# Patient Record
Sex: MALE | Race: ASIAN | URBAN - METROPOLITAN AREA
[De-identification: names, ages, dates, MRNs, and addresses within clinical notes are randomized per-mention and may not be internally consistent; named-entity substitution may affect disease eponyms.]

---

## 2018-03-15 ENCOUNTER — HOSPITAL ENCOUNTER (OUTPATIENT)
Facility: CLINIC | Age: 33
Setting detail: SPECIMEN
Discharge: HOME | End: 2018-03-15
Admitting: INTERNAL MEDICINE

## 2018-03-15 PROCEDURE — 80076 HEPATIC FUNCTION PANEL: CPT | Performed by: INTERNAL MEDICINE

## 2018-03-16 DIAGNOSIS — K59.00 CONSTIPATION, UNSPECIFIED: Primary | ICD-10-CM

## 2018-03-16 LAB
ALBUMIN SERPL-MCNC: 4.5 G/DL (ref 3.4–5)
ALP SERPL-CCNC: 87 U/L (ref 40–150)
ALT SERPL W P-5'-P-CCNC: 35 U/L (ref 0–70)
AST SERPL W P-5'-P-CCNC: 30 U/L (ref 0–45)
BILIRUB DIRECT SERPL-MCNC: <0.1 MG/DL (ref 0–0.2)
BILIRUB SERPL-MCNC: 0.2 MG/DL (ref 0.2–1.3)
PROT SERPL-MCNC: 8.4 G/DL (ref 6.8–8.8)

## 2018-03-20 ENCOUNTER — TELEPHONE (OUTPATIENT)
Dept: FAMILY MEDICINE | Facility: CLINIC | Age: 33
End: 2018-03-20

## 2018-03-20 DIAGNOSIS — Z53.9 ERRONEOUS ENCOUNTER--DISREGARD: Primary | ICD-10-CM

## 2018-03-20 NOTE — TELEPHONE ENCOUNTER
I discussed with Mr. Kingsley that his lab results all came back within normal limits.  If he experiences his abdominal pain again, it was recommended that he come back to the clinic for further evaluation.

## 2018-03-26 ENCOUNTER — TELEPHONE (OUTPATIENT)
Dept: FAMILY MEDICINE | Facility: CLINIC | Age: 33
End: 2018-03-26

## 2018-03-27 NOTE — TELEPHONE ENCOUNTER
Returned patient call reporting blood pressure reading. Wife called and reported BP numbers of 106/66. She was told that low numbers should be reported. In conversation with wife, she states that he hasn't had any symptoms of hypotension (dizziness, light-headed). Patient had started using Miralax two weeks ago for constipation which has successfully improved his condition. She mentioned he did have some nausea and vomiting during the first week of using the Miralax that resolved a week ago. Assured the patient that while the numbers were on the lower side, if he was asymptomatic, they weren't of concern.  Recommended him to increase/maintain his fluid intake since his bowel movements had resumed. Wife denied Scott having experienced any postural or orthostatic hypotension (dizziness when standing up). She felt reassured that low BP numbers were not concerning and said she will be returning to clinic herself to  his medication refills at that time.    Thank You~  Nicolas Cintron, Student Pharmacist   [facility]      _____________________________  Preceptor Use Only:  In supervising the student, I have reviewed and verified the student's documentation and found it to be correct and complete.   Preceptor Signature: Feliberto Garcia, PharmD, MPH

## 2018-04-26 NOTE — ADDENDUM NOTE
Encounter addended by: Rubi Mendosa on: 4/26/2018 12:36 PM<BR>     Actions taken: LOS modified, Visit diagnoses modified, Pend clinical note

## 2018-04-26 NOTE — ADDENDUM NOTE
Encounter addended by: Rubi Mendosa on: 4/26/2018 12:23 PM<BR>     Actions taken: LOS modified, Visit diagnoses modified, Pend clinical note, Sign clinical note, Delete clinical note